# Patient Record
Sex: MALE | Race: WHITE | NOT HISPANIC OR LATINO | Employment: FULL TIME | URBAN - METROPOLITAN AREA
[De-identification: names, ages, dates, MRNs, and addresses within clinical notes are randomized per-mention and may not be internally consistent; named-entity substitution may affect disease eponyms.]

---

## 2018-02-28 NOTE — PROGRESS NOTES
Assessment    1  Influenza-like illness (487 1) (J11 1)   2  Body mass index (BMI) 23 0-23 9, adult (V85 1) (Z68 23)   3  Acute upper respiratory infection (465 9) (J06 9)    Plan  Influenza-like illness    · Tamiflu 75 MG Oral Capsule; TAKE 1 CAPSULE TWICE DAILY   · Zithromax Z-Isak 250 MG Oral Tablet (Azithromycin); Take as directed   · Drink at least 6 glasses of water or juice a day ; Status:Complete;   Done: 72BFX1691   · Drink plenty of fluids ; Status:Complete;   Done: 63QZB3555   · Good hand washing is one of the best ways to control the spread of germs ;  Status:Complete;   Done: 31BIC4412   · Shared Decision Making Aid given; Status:Complete;   Done: 57Vjn4704   · The following home treatments may soothe a sore throat ; Status:Complete;   Done:  82ZPO0043   · You may slowly resume your normal level of activity once you feel better ;  Status:Complete;   Done: 75BSR4453    Discussion/Summary    Start with the tamiflu for presumptive influenza and also take mucinex DM 2x/d  If by the end of the tamiflu you are still ill with fever/discolored mucus, next step would be to start the antibiotic (zithromax)  The patient was counseled regarding instructions for management, risk factor reductions, impressions, risks and benefits of treatment options, importance of compliance with treatment  Possible side effects of new medications were reviewed with the patient/guardian today  The treatment plan was reviewed with the patient/guardian  The patient/guardian understands and agrees with the treatment plan      Chief Complaint  pt present c/o cough and cold  hg      History of Present Illness  HPI: flu test neg  2nd day  cough, chills  today fatigue  back aches  no travel  whole body aches  101 temp today  never had flu shot         Review of Systems    Cardiovascular: no chest pain  Respiratory: no shortness of breath  Active Problems    1  Tick Bites (919 4)    Family History    1   Family history of Hypertension (V17 49)    2  Family history of No Significant Family History  Family History Reviewed: The family history was reviewed and updated today  Social History    · Being A Social Drinker   · Never A Smoker  The social history was reviewed and is unchanged  Current Meds   1  No Reported Medications Recorded    Allergies    1  No Known Drug Allergies    Vitals   Recorded: 12Apr2016 04:45PM   Temperature 101 2 F   Heart Rate 90   Respiration 16   Systolic 057   Diastolic 80   Height 6 ft    Weight 170 lb    BMI Calculated 23 06   BSA Calculated 1 99     Physical Exam    Constitutional   General appearance: No acute distress, well appearing and well nourished  Eyes   Conjunctiva and lids: No swelling, erythema, or discharge  Pupils and irises: Equal, round and reactive to light  Ears, Nose, Mouth, and Throat   External inspection of ears and nose: Normal     Otoscopic examination: Tympanic membrance translucent with normal light reflex  Canals patent without erythema  Nasal mucosa, septum, and turbinates: Normal without edema or erythema  Oropharynx: Normal with no erythema, edema, exudate or lesions  Pulmonary   Respiratory effort: Abnormal   Respiratory Findings: barky cough, but no audible wheezing  Auscultation of lungs: Clear to auscultation, equal breath sounds bilaterally, no wheezes, no rales, no rhonci  Cardiovascular   Auscultation of heart: Normal rate and rhythm, normal S1 and S2, without murmurs  Examination of extremities for edema and/or varicosities: Normal     Abdomen   Abdomen: Non-tender, no masses  Lymphatic   Palpation of lymph nodes in neck: No lymphadenopathy  Musculoskeletal   Gait and station: Normal     Digits and nails: Normal without clubbing or cyanosis  Skin   Skin and subcutaneous tissue: Normal without rashes or lesions      Psychiatric   Mood and affect: Normal          Signatures   Electronically signed by : Scarlett Dawkins DO; Apr 12 2016 10:15PM EST                       (Author)

## 2018-02-28 NOTE — MISCELLANEOUS
Excuse from work on 4/12 and 4/13/16        Electronically signed by:Hiren Ventura DO  Apr 12 2016  5:03PM EST Review

## 2021-01-14 ENCOUNTER — HOSPITAL ENCOUNTER (EMERGENCY)
Facility: HOSPITAL | Age: 31
Discharge: HOME | End: 2021-01-14
Attending: EMERGENCY MEDICINE | Admitting: EMERGENCY MEDICINE
Payer: COMMERCIAL

## 2021-01-14 VITALS
HEART RATE: 90 BPM | OXYGEN SATURATION: 99 % | RESPIRATION RATE: 18 BRPM | WEIGHT: 169.75 LBS | DIASTOLIC BLOOD PRESSURE: 78 MMHG | BODY MASS INDEX: 22.99 KG/M2 | TEMPERATURE: 98.3 F | SYSTOLIC BLOOD PRESSURE: 160 MMHG | HEIGHT: 72 IN

## 2021-01-14 DIAGNOSIS — R45.4 ANGER: Primary | ICD-10-CM

## 2021-01-14 LAB — ETHANOL EXG-MCNC: 0 MG/DL

## 2021-01-14 PROCEDURE — 99284 EMERGENCY DEPT VISIT MOD MDM: CPT

## 2021-01-14 PROCEDURE — 82075 ASSAY OF BREATH ETHANOL: CPT | Performed by: EMERGENCY MEDICINE

## 2021-01-14 PROCEDURE — 99284 EMERGENCY DEPT VISIT MOD MDM: CPT | Performed by: EMERGENCY MEDICINE

## 2021-01-14 RX ORDER — NICOTINE 21 MG/24HR
21 PATCH, TRANSDERMAL 24 HOURS TRANSDERMAL ONCE
Status: DISCONTINUED | OUTPATIENT
Start: 2021-01-14 | End: 2021-01-14 | Stop reason: HOSPADM

## 2021-01-14 RX ORDER — IBUPROFEN 600 MG/1
600 TABLET ORAL ONCE
Status: COMPLETED | OUTPATIENT
Start: 2021-01-14 | End: 2021-01-14

## 2021-01-14 RX ADMIN — IBUPROFEN 600 MG: 600 TABLET, FILM COATED ORAL at 14:28

## 2021-01-14 RX ADMIN — NICOTINE 21 MG: 21 PATCH, EXTENDED RELEASE TRANSDERMAL at 17:15

## 2021-01-14 NOTE — DISCHARGE INSTRUCTIONS
Follow up with resources as provided by crisis   Return to ED if you feel like hurting yourself or others

## 2021-01-14 NOTE — ED NOTES
Per provider patient can remain in his clothing as he is not deemed a risk at this time  Patient has been cooperative with intake process thus far  Patient emptied his pocks of his belongings  Patient provided a urine sample  Patient is calm and cooperative  Based on the assessment completed by Crisis will determine when the patient will be changed into paper scrubs per Dr Matthew Armstrong   Continual observation initiated upon arrival      Jose F Triplett  01/14/21 7952
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
## 2022-10-30 ENCOUNTER — HOSPITAL ENCOUNTER (EMERGENCY)
Facility: HOSPITAL | Age: 32
Discharge: HOME | End: 2022-10-30
Attending: EMERGENCY MEDICINE

## 2022-10-30 ENCOUNTER — APPOINTMENT (EMERGENCY)
Dept: CT IMAGING | Facility: HOSPITAL | Age: 32
End: 2022-10-30

## 2022-10-30 VITALS
RESPIRATION RATE: 17 BRPM | OXYGEN SATURATION: 100 % | BODY MASS INDEX: 28.14 KG/M2 | SYSTOLIC BLOOD PRESSURE: 142 MMHG | WEIGHT: 190 LBS | TEMPERATURE: 97.5 F | DIASTOLIC BLOOD PRESSURE: 65 MMHG | HEIGHT: 69 IN | HEART RATE: 82 BPM

## 2022-10-30 DIAGNOSIS — S01.81XA FACIAL LACERATION: ICD-10-CM
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
## 2022-10-30 DIAGNOSIS — S02.40CA MAXILLARY FRACTURE, RIGHT SIDE, INITIAL ENCOUNTER FOR CLOSED FRACTURE (HCC): ICD-10-CM

## 2022-10-30 DIAGNOSIS — K08.419: ICD-10-CM

## 2022-10-30 RX ORDER — CHLORHEXIDINE GLUCONATE 0.12 MG/ML
15 RINSE ORAL 2 TIMES DAILY
Qty: 120 ML | Refills: 0 | Status: SHIPPED | OUTPATIENT
Start: 2022-10-30

## 2022-10-30 RX ORDER — NAPROXEN 500 MG/1
500 TABLET ORAL 2 TIMES DAILY WITH MEALS
Qty: 10 TABLET | Refills: 0 | Status: SHIPPED | OUTPATIENT
Start: 2022-10-30

## 2022-10-30 RX ORDER — CLINDAMYCIN HYDROCHLORIDE 150 MG/1
300 CAPSULE ORAL ONCE
Status: COMPLETED | OUTPATIENT
Start: 2022-10-30 | End: 2022-10-30

## 2022-10-30 RX ORDER — CLINDAMYCIN HYDROCHLORIDE 150 MG/1
150 CAPSULE ORAL 3 TIMES DAILY
Qty: 30 CAPSULE | Refills: 0 | Status: SHIPPED | OUTPATIENT
Start: 2022-10-30 | End: 2022-11-09

## 2022-10-30 RX ADMIN — CLINDAMYCIN HYDROCHLORIDE 300 MG: 150 CAPSULE ORAL at 15:20

## 2022-10-30 RX ADMIN — TETANUS TOXOID, REDUCED DIPHTHERIA TOXOID AND ACELLULAR PERTUSSIS VACCINE, ADSORBED 0.5 ML: 5; 2.5; 8; 8; 2.5 SUSPENSION INTRAMUSCULAR at 15:21

## 2022-10-30 NOTE — DISCHARGE INSTRUCTIONS
Have your sutures out in 5-7 days you may return to the ER that place them    Call the oral surgeon tomorrow the expecting her call for a follow-up this week    Return with any worsening symptoms questions, or concerns

## 2022-10-30 NOTE — Clinical Note
Michelle Clark was seen and treated in our emergency department on 10/30/2022  Diagnosis: Facial fracture    Dilip Ramirez    He may return on this date: 10/25/2022         If you have any questions or concerns, please don't hesitate to call        Jesus Nevarez PA-C    ______________________________           _______________          _______________  Hospital Representative                              Date                                Time

## 2022-10-30 NOTE — Clinical Note
Abida Molina was seen and treated in our emergency department on 10/30/2022  Diagnosis: Facial fracture    Zane Actis    He may return on this date: 11/01/2022         If you have any questions or concerns, please don't hesitate to call        Bree Ledezma RN    ______________________________           _______________          _______________  Hospital Representative                              Date                                Time
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
## 2022-10-30 NOTE — Clinical Note
Padmaja Bosch was seen and treated in our emergency department on 10/30/2022  Diagnosis:     Titus Sanchez    He may return on this date: If you have any questions or concerns, please don't hesitate to call        Twila Patricia MD    ______________________________           _______________          _______________  Hospital Representative                              Date                                Time